# Patient Record
Sex: FEMALE | URBAN - METROPOLITAN AREA
[De-identification: names, ages, dates, MRNs, and addresses within clinical notes are randomized per-mention and may not be internally consistent; named-entity substitution may affect disease eponyms.]

---

## 2018-04-09 ENCOUNTER — HOSPITAL ENCOUNTER (OUTPATIENT)
Dept: MAMMOGRAPHY | Age: 38
Discharge: HOME | End: 2018-04-09
Attending: INTERNAL MEDICINE | Admitting: INTERNAL MEDICINE

## 2018-04-09 DIAGNOSIS — Z12.31 VISIT FOR SCREENING MAMMOGRAM: ICD-10-CM

## 2023-05-02 LAB — GROUP A STREP, PCR: NOT DETECTED

## 2023-05-19 LAB
ALANINE AMINOTRANSFERASE (SGPT) (U/L) IN SER/PLAS: 13 U/L (ref 7–45)
ALBUMIN (G/DL) IN SER/PLAS: 4.7 G/DL (ref 3.4–5)
ALBUMIN ELP: 4.4 G/DL (ref 3.4–5)
ALKALINE PHOSPHATASE (U/L) IN SER/PLAS: 61 U/L (ref 33–110)
ALLERGEN PAPER WASP IGE (KU/L): 2.32 KU/L
ALLERGEN VENOM: COMMON WASP (YELLOW JACKET) (VESPULA SPP.) IGE (KU/L): 10.7 KU/L
ALLERGEN VENOM: HONEY BEE (APIS MELLIFERA) IGE (KU/L): <0.1 KU/L
ALLERGEN VENOM: WHITE-FACED HORNET (DOLICHOVESPULA MACULATA) IGE (KU/L): 1.64 KU/L
ALLERGEN VENOM: YELLOW HORNET (DOLICHOVESPULA ARENARIA) IGE (KU/L): 0.34 KU/L
ALPHA 1: 0.4 G/DL (ref 0.2–0.6)
ALPHA 2: 1.1 G/DL (ref 0.4–1.1)
ANION GAP IN SER/PLAS: 14 MMOL/L (ref 10–20)
ASPARTATE AMINOTRANSFERASE (SGOT) (U/L) IN SER/PLAS: 13 U/L (ref 9–39)
BASOPHILS (10*3/UL) IN BLOOD BY AUTOMATED COUNT: 0.06 X10E9/L (ref 0–0.1)
BASOPHILS/100 LEUKOCYTES IN BLOOD BY AUTOMATED COUNT: 0.6 % (ref 0–2)
BETA: 0.9 G/DL (ref 0.5–1.2)
BILIRUBIN TOTAL (MG/DL) IN SER/PLAS: 0.3 MG/DL (ref 0–1.2)
CALCIUM (MG/DL) IN SER/PLAS: 10.1 MG/DL (ref 8.6–10.6)
CARBON DIOXIDE, TOTAL (MMOL/L) IN SER/PLAS: 27 MMOL/L (ref 21–32)
CHLORIDE (MMOL/L) IN SER/PLAS: 103 MMOL/L (ref 98–107)
CREATININE (MG/DL) IN SER/PLAS: 0.76 MG/DL (ref 0.5–1.05)
EOSINOPHILS (10*3/UL) IN BLOOD BY AUTOMATED COUNT: 0.16 X10E9/L (ref 0–0.7)
EOSINOPHILS/100 LEUKOCYTES IN BLOOD BY AUTOMATED COUNT: 1.5 % (ref 0–6)
ERYTHROCYTE DISTRIBUTION WIDTH (RATIO) BY AUTOMATED COUNT: 12.9 % (ref 11.5–14.5)
ERYTHROCYTE MEAN CORPUSCULAR HEMOGLOBIN CONCENTRATION (G/DL) BY AUTOMATED: 32.2 G/DL (ref 32–36)
ERYTHROCYTE MEAN CORPUSCULAR VOLUME (FL) BY AUTOMATED COUNT: 90 FL (ref 80–100)
ERYTHROCYTES (10*6/UL) IN BLOOD BY AUTOMATED COUNT: 4.71 X10E12/L (ref 4–5.2)
GAMMA GLOBULIN: 1.2 G/DL (ref 0.5–1.4)
GFR FEMALE: >90 ML/MIN/1.73M2
GLUCOSE (MG/DL) IN SER/PLAS: 81 MG/DL (ref 74–99)
HEMATOCRIT (%) IN BLOOD BY AUTOMATED COUNT: 42.5 % (ref 36–46)
HEMOGLOBIN (G/DL) IN BLOOD: 13.7 G/DL (ref 12–16)
IMMATURE GRANULOCYTES/100 LEUKOCYTES IN BLOOD BY AUTOMATED COUNT: 0.5 % (ref 0–0.9)
IMMUNOCAP IGE: 32.4 KU/L (ref 0–214)
IMMUNOCAP INTERPRETATION: NORMAL
LEUKOCYTES (10*3/UL) IN BLOOD BY AUTOMATED COUNT: 10.5 X10E9/L (ref 4.4–11.3)
LYMPHOCYTES (10*3/UL) IN BLOOD BY AUTOMATED COUNT: 2.69 X10E9/L (ref 1.2–4.8)
LYMPHOCYTES/100 LEUKOCYTES IN BLOOD BY AUTOMATED COUNT: 25.7 % (ref 13–44)
MONOCYTES (10*3/UL) IN BLOOD BY AUTOMATED COUNT: 0.49 X10E9/L (ref 0.1–1)
MONOCYTES/100 LEUKOCYTES IN BLOOD BY AUTOMATED COUNT: 4.7 % (ref 2–10)
NEUTROPHILS (10*3/UL) IN BLOOD BY AUTOMATED COUNT: 7 X10E9/L (ref 1.2–7.7)
NEUTROPHILS/100 LEUKOCYTES IN BLOOD BY AUTOMATED COUNT: 67 % (ref 40–80)
NRBC (PER 100 WBCS) BY AUTOMATED COUNT: 0 /100 WBC (ref 0–0)
PATH REVIEW-SERUM PROTEIN ELECTROPHORESIS: NORMAL
PLATELETS (10*3/UL) IN BLOOD AUTOMATED COUNT: 366 X10E9/L (ref 150–450)
POTASSIUM (MMOL/L) IN SER/PLAS: 3.9 MMOL/L (ref 3.5–5.3)
PROTEIN ELECTROPHORESIS INTERPRETATION: NORMAL
PROTEIN TOTAL: 8 G/DL (ref 6.4–8.2)
PROTEIN TOTAL: 8 G/DL (ref 6.4–8.2)
SEDIMENTATION RATE, ERYTHROCYTE: 46 MM/H (ref 0–20)
SODIUM (MMOL/L) IN SER/PLAS: 140 MMOL/L (ref 136–145)
THYROPEROXIDASE AB (IU/ML) IN SER/PLAS: 33 IU/ML
UREA NITROGEN (MG/DL) IN SER/PLAS: 11 MG/DL (ref 6–23)

## 2023-05-22 LAB — THYROGLOBULIN AB (IU/ML) IN SER/PLAS: <0.9 IU/ML (ref 0–4)

## 2023-05-25 LAB — URTICARIA-INDUCING ACTIVITY: <1

## 2023-05-29 LAB — TRYPTASE: 4.5 MCG/L

## 2023-12-01 ENCOUNTER — OFFICE VISIT (OUTPATIENT)
Dept: URGENT CARE | Facility: CLINIC | Age: 43
End: 2023-12-01
Payer: COMMERCIAL

## 2023-12-01 VITALS
RESPIRATION RATE: 20 BRPM | TEMPERATURE: 98.2 F | WEIGHT: 170 LBS | DIASTOLIC BLOOD PRESSURE: 73 MMHG | BODY MASS INDEX: 26.63 KG/M2 | HEART RATE: 90 BPM | SYSTOLIC BLOOD PRESSURE: 111 MMHG | OXYGEN SATURATION: 98 %

## 2023-12-01 DIAGNOSIS — J01.10 ACUTE NON-RECURRENT FRONTAL SINUSITIS: Primary | ICD-10-CM

## 2023-12-01 PROCEDURE — 1036F TOBACCO NON-USER: CPT | Performed by: FAMILY MEDICINE

## 2023-12-01 PROCEDURE — 99203 OFFICE O/P NEW LOW 30 MIN: CPT | Performed by: FAMILY MEDICINE

## 2023-12-01 RX ORDER — EPINEPHRINE 0.3 MG/.3ML
INJECTION INTRAMUSCULAR
COMMUNITY

## 2023-12-01 RX ORDER — AMOXICILLIN 875 MG/1
875 TABLET, FILM COATED ORAL 2 TIMES DAILY
Qty: 14 TABLET | Refills: 0 | Status: SHIPPED | OUTPATIENT
Start: 2023-12-01 | End: 2023-12-08

## 2023-12-01 RX ORDER — ACYCLOVIR 400 MG/1
400 TABLET ORAL 2 TIMES DAILY
COMMUNITY

## 2023-12-01 RX ORDER — AZELASTINE 1 MG/ML
1 SPRAY, METERED NASAL 2 TIMES DAILY
COMMUNITY
Start: 2023-02-11

## 2023-12-01 RX ORDER — MECLIZINE HYDROCHLORIDE 25 MG/1
25 TABLET ORAL 3 TIMES DAILY PRN
COMMUNITY
Start: 2023-03-01

## 2023-12-01 RX ORDER — BUSPIRONE HYDROCHLORIDE 10 MG/1
10 TABLET ORAL 2 TIMES DAILY
COMMUNITY

## 2023-12-01 NOTE — PROGRESS NOTES
Subjective   Patient ID: Edwina Logan is a 43 y.o. female.    HPI    The following portions of the chart were reviewed this encounter and updated as appropriate:       1 week of congestion, cough, malaise, sinus pressure.  Symptoms started last weekend with cold symptoms, improved after taking cold and flu medication but then past couple days worsened again.  Now feeling more pressure, postnasal drainage.  Wet cough.  No fever or chills.  Exposed to coworker last week with COVID, tested negative last night.  No fever or chills.  No body aches.  Has been also with bronchitis.  Patient with no history of asthma or bronchitis.  Continues to use Advil Cold and Sinus.  History of sinus infections.  No seasonal allergies.  No other complaints or symptoms.    Review of Systems  Objective   Physical Exam      Constitutional: vital signs reviewed. Well developed, well nourished. patient alert and patient without distress.   Head and Face: Normal and atraumatic.  Palpation of the face and sinuses: Normal.  Ears, Nose, Mouth, and Throat:   Hearing: Normal.  External inspection of nose: Normal.   Lips, teeth, tongue and gums: Normal and well hydrated. External inspection of ears: Normal. Ear canals and TMs: Normal.  Posterior pharynx moist and with post nasal drip.  Nasal mucosa: Congested, inflamed  Neck: No neck mass was observed. Supple. normal muscle tone.   Cardiovascular: Heart rate normal, normal S1 and S2, no gallops, no murmurs and no pericardial rub. Rhythm: Normal.  Pulmonary: No respiratory distress. Palpation of chest: Normal. Clear bilateral breath sounds.   Lymphatic: No cervical lymphadenopathy  Psych: Normal mood and affect    Procedures    Assessment/Plan

## 2023-12-01 NOTE — PATIENT INSTRUCTIONS
Drink plenty of fluids to help keep your mucus thin.  Apply moist heat (using a hot, damp towel) to your face for 5 to 10 minutes, several times a day.  Breathe warm, moist air from a steamy shower, a hot bath, or a sink filled with hot water. You can drink warm drinks such as tea.   Avoid extremely cool, dry air. Consider using a humidifier to increase the moisture in the air in your home.  Use saltwater nasal washes(saline irrigation) to help keep the nasal passages open and wash out mucus and bacteria.    You may use over-the-counter anti-inflammatory such as ibuprofen, Advil, Aleve for pain. For inflammation, ibuprofen doses include 400-600 mg 2-3 times per day. Tylenol is acceptable to use for pain.    Cough Suppressants or expectorants may be taken over-the-counter if you are having cough with your symptoms. These include Delsym, Robitussin-DM,    You will be started on antibiotic which will be sent to the pharmacy; please complete the regimen as directed even if your symptoms improve. It is recommended to take an Probiotic while on this medication to reduce symptoms of upset stomach, diarrhea, and in women, yeast infections.     Start taking a nasal steroid which may include: Flonase, Nasacort, or Nasonex and use as directed. These medications are now available over the counter.

## 2024-02-13 ENCOUNTER — OFFICE VISIT (OUTPATIENT)
Dept: DERMATOLOGY | Facility: CLINIC | Age: 44
End: 2024-02-13
Payer: COMMERCIAL

## 2024-02-13 DIAGNOSIS — R21 RASH AND OTHER NONSPECIFIC SKIN ERUPTION: Primary | ICD-10-CM

## 2024-02-13 PROCEDURE — 1036F TOBACCO NON-USER: CPT | Performed by: NURSE PRACTITIONER

## 2024-02-13 PROCEDURE — 99204 OFFICE O/P NEW MOD 45 MIN: CPT | Performed by: NURSE PRACTITIONER

## 2024-02-13 RX ORDER — CLOBETASOL PROPIONATE 0.46 MG/ML
SOLUTION TOPICAL 2 TIMES DAILY
Qty: 50 ML | Refills: 3 | Status: SHIPPED | OUTPATIENT
Start: 2024-02-13 | End: 2024-02-27

## 2024-02-13 RX ORDER — KETOCONAZOLE 20 MG/ML
SHAMPOO, SUSPENSION TOPICAL EVERY OTHER DAY
Qty: 120 ML | Refills: 2 | Status: SHIPPED | OUTPATIENT
Start: 2024-02-13

## 2024-02-13 NOTE — PROGRESS NOTES
Subjective     Edwina Logan is a 43 y.o. female who presents for the following: Rash.     New patient visit in for itch and irritation to scalp present for years. Patient states she has been prescribed in the past creams that were somewhat helpful she is unsure the names of.   Patient states over time the areas have spread and become worse.     Review of Systems:  No other skin or systemic complaints other than what is documented elsewhere in the note.    The following portions of the chart were reviewed this encounter and updated as appropriate:       Skin Cancer History  No skin cancer on file.    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), Other specified symptoms and signs involving the circulatory and respiratory systems (04/22/2021), Otitis media, unspecified, right ear (07/02/2019), Personal history of other diseases of the respiratory system (11/09/2018), Personal history of other diseases of the respiratory system (04/22/2021), Personal history of other diseases of the respiratory system (10/09/2019), Personal history of other diseases of the respiratory system (12/21/2019), Personal history of other infectious and parasitic diseases, Personal history of other infectious and parasitic diseases (06/09/2020), Personal history of other specified conditions, and Personal history of other specified conditions (07/10/2019).    Past Surgical History:  Edwina Logan  has a past surgical history that includes Other surgical history (07/10/2019).    Family History:  Patient family history is not on file.    Social History:  Edwina Logan  reports that she has never  smoked. She has never used smokeless tobacco. No history on file for alcohol use and drug use.    Allergies:  Bee pollens and Other    Current Medications / CAM's:    Current Outpatient Medications:     acyclovir (Zovirax) 400 mg tablet, Take 1 tablet (400 mg) by mouth 2 times a day., Disp: , Rfl:     azelastine (Astelin) 137 mcg (0.1 %) nasal spray, Administer 1 spray into each nostril 2 times a day., Disp: , Rfl:     busPIRone (Buspar) 10 mg tablet, Take 1 tablet (10 mg) by mouth 2 times a day., Disp: , Rfl:     clobetasol (Temovate) 0.05 % external solution, Apply topically 2 times a day for 14 days. Or until rash clears., Disp: 50 mL, Rfl: 3    EPINEPHrine (EpiPen) 0.3 mg/0.3 mL injection syringe, Inject into the muscle., Disp: , Rfl:     ketoconazole (NIZOral) 2 % shampoo, Apply topically every other day., Disp: 120 mL, Rfl: 2    meclizine (Antivert) 25 mg tablet, Take 1 tablet (25 mg) by mouth 3 times a day as needed for dizziness., Disp: , Rfl:      Objective   Well appearing patient in no apparent distress; mood and affect are within normal limits.      Assessment/Plan   1. Rash and other nonspecific skin eruption  Mid Occipital Scalp  Well demarcated erythematous, scaling, plaque to occipital scalp present intermittently. Improved in summer.  No significant history or family history.     Ddx: [facility] (favored) vs. [facility]    PLAN:  Ketoconazole 2% shampoo 1-2 times a week increasing and decreasing as needed  Clobetasol 0.05% scalp solution twice daily as needed for itching    ketoconazole (NIZOral) 2 % shampoo - Mid Occipital Scalp  Apply topically every other day.    clobetasol (Temovate) 0.05 % external solution - Mid Occipital Scalp  Apply topically 2 times a day for 14 days. Or until rash clears.    Related Procedures  Follow Up In Dermatology - Established Patient

## 2024-04-01 ENCOUNTER — TELEMEDICINE (OUTPATIENT)
Dept: DERMATOLOGY | Facility: CLINIC | Age: 44
End: 2024-04-01
Payer: COMMERCIAL

## 2024-04-01 DIAGNOSIS — R21 RASH AND OTHER NONSPECIFIC SKIN ERUPTION: ICD-10-CM

## 2024-04-01 DIAGNOSIS — L21.9 SEBORRHEIC DERMATITIS: Primary | ICD-10-CM

## 2024-04-01 PROCEDURE — 99213 OFFICE O/P EST LOW 20 MIN: CPT | Performed by: NURSE PRACTITIONER

## 2024-04-01 PROCEDURE — 1036F TOBACCO NON-USER: CPT | Performed by: NURSE PRACTITIONER

## 2024-04-01 NOTE — PROGRESS NOTES
Subjective     Edwina Logan is a 43 y.o. female who presents for the following: Rash.     Established patient last seen 02/2024 for scalp and prescribed to use Ketoconazole 2% shampoo 1-2 times a week increasing and decreasing as needed  Clobetasol 0.05% scalp solution twice daily as needed for itching    Review of Systems:  No other skin or systemic complaints other than what is documented elsewhere in the note.    The following portions of the chart were reviewed this encounter and updated as appropriate:       Skin Cancer History  No skin cancer on file.    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), Other specified symptoms and signs involving the circulatory and respiratory systems (04/22/2021), Otitis media, unspecified, right ear (07/02/2019), Personal history of other diseases of the respiratory system (11/09/2018), Personal history of other diseases of the respiratory system (04/22/2021), Personal history of other diseases of the respiratory system (10/09/2019), Personal history of other diseases of the respiratory system (12/21/2019), Personal history of other infectious and parasitic diseases, Personal history of other infectious and parasitic diseases (06/09/2020), Personal history of other specified conditions, and Personal history of other specified conditions (07/10/2019).    Past Surgical History:  Edwina Logan  has a past surgical history that includes Other surgical history (07/10/2019).    Family History:  Patient family history is not on file.    Social History:  Edwina Logan  reports that she has never smoked. She has never used smokeless  tobacco. No history on file for alcohol use and drug use.    Allergies:  Bee pollens and Other    Current Medications / CAM's:    Current Outpatient Medications:     acyclovir (Zovirax) 400 mg tablet, Take 1 tablet (400 mg) by mouth 2 times a day., Disp: , Rfl:     azelastine (Astelin) 137 mcg (0.1 %) nasal spray, Administer 1 spray into each nostril 2 times a day., Disp: , Rfl:     busPIRone (Buspar) 10 mg tablet, Take 1 tablet (10 mg) by mouth 2 times a day., Disp: , Rfl:     EPINEPHrine (EpiPen) 0.3 mg/0.3 mL injection syringe, Inject into the muscle., Disp: , Rfl:     ketoconazole (NIZOral) 2 % shampoo, Apply topically every other day., Disp: 120 mL, Rfl: 2    meclizine (Antivert) 25 mg tablet, Take 1 tablet (25 mg) by mouth 3 times a day as needed for dizziness., Disp: , Rfl:      Objective   Well appearing patient in no apparent distress; mood and affect are within normal limits.      Assessment/Plan   1. Seborrheic dermatitis  Mid Occipital Scalp  Clear on exam.  Patient states she has control with once weekly application of ketoconazole 2% shampoo    Plan: Counseling.  I counseled the patient regarding the following:  Skin care: Emollients, shampoos with tar,selenium or zinc pyrithione can improve seborrheic dermatitis.    Expectations: Seborrheic  Dermatitis is chronic in nature with periods of remissions and flares. Flares can be triggered by stress.  Contact office if: Seborrheic dermatitis worsens, or fails to improve despite several months of treatment.    I discussed with the patient that prolonged use of topical steroids can result in the increased appearance of superficial blood vessels (telangiectasias), lightening (hypopigmentation) and thinning of  the skin (atrophy).       Patient understands to avoid using high potency steroids in skin folds, the groin or the face.  The patient verbalized understanding of the proper use and possible adverse effects of topical steroids.      All of the patient's  questions and concerns were addressed.    PLAN:  Ketoconazole 2% shampoo 1-2 times a week increasing and decreasing as needed  Clobetasol 0.05% scalp solution twice daily       2. Rash and other nonspecific skin eruption    Related Procedures  Follow Up In Dermatology - Established Patient    Related Medications  ketoconazole (NIZOral) 2 % shampoo  Apply topically every other day.

## 2024-07-03 ENCOUNTER — OFFICE VISIT (OUTPATIENT)
Dept: URGENT CARE | Facility: CLINIC | Age: 44
End: 2024-07-03
Payer: COMMERCIAL

## 2024-07-03 VITALS
OXYGEN SATURATION: 98 % | RESPIRATION RATE: 20 BRPM | SYSTOLIC BLOOD PRESSURE: 120 MMHG | DIASTOLIC BLOOD PRESSURE: 78 MMHG | TEMPERATURE: 97.9 F | HEART RATE: 96 BPM

## 2024-07-03 DIAGNOSIS — J02.9 SORE THROAT: ICD-10-CM

## 2024-07-03 DIAGNOSIS — J03.00 ACUTE NON-RECURRENT STREPTOCOCCAL TONSILLITIS: Primary | ICD-10-CM

## 2024-07-03 LAB — POC RAPID STREP: POSITIVE

## 2024-07-03 PROCEDURE — 87880 STREP A ASSAY W/OPTIC: CPT | Performed by: PHYSICIAN ASSISTANT

## 2024-07-03 PROCEDURE — 99213 OFFICE O/P EST LOW 20 MIN: CPT | Performed by: PHYSICIAN ASSISTANT

## 2024-07-03 RX ORDER — AMOXICILLIN 500 MG/1
500 CAPSULE ORAL 3 TIMES DAILY
Qty: 30 CAPSULE | Refills: 0 | Status: SHIPPED | OUTPATIENT
Start: 2024-07-03 | End: 2024-07-13

## 2024-07-03 ASSESSMENT — ENCOUNTER SYMPTOMS: SORE THROAT: 1

## 2024-07-03 ASSESSMENT — PAIN SCALES - GENERAL: PAINLEVEL: 7

## 2024-07-03 NOTE — PROGRESS NOTES
Subjective   Patient ID: Edwina Logan is a 43 y.o. female.    Patient is a 43-year-old female who complains of acute onset of sore throat that she has been experiencing for approximately the past 16 hours.  Patient denies fever, chills, congestion, sinus pressure, ear pain or other illness symptoms.  Patient states she has had no contact with individuals known to have group A strep to her knowledge.      Sore Throat     The following portions of the chart were reviewed this encounter and updated as appropriate:       Review of Systems   HENT:  Positive for sore throat.    All other systems reviewed and are negative.  Objective   Physical Exam  Vitals and nursing note reviewed.   Constitutional:       Appearance: Normal appearance. She is normal weight.   HENT:      Head: Normocephalic and atraumatic.      Right Ear: Tympanic membrane, ear canal and external ear normal.      Left Ear: Tympanic membrane, ear canal and external ear normal.      Nose: Nose normal. No congestion or rhinorrhea.      Mouth/Throat:      Mouth: Mucous membranes are moist.      Pharynx: Oropharynx is clear. No oropharyngeal exudate or posterior oropharyngeal erythema.   Eyes:      Extraocular Movements: Extraocular movements intact.      Conjunctiva/sclera: Conjunctivae normal.      Pupils: Pupils are equal, round, and reactive to light.   Cardiovascular:      Rate and Rhythm: Normal rate and regular rhythm.      Pulses: Normal pulses.      Heart sounds: Normal heart sounds.   Pulmonary:      Effort: Pulmonary effort is normal. No respiratory distress.      Breath sounds: Normal breath sounds. No stridor. No wheezing, rhonchi or rales.   Musculoskeletal:      Cervical back: Normal range of motion and neck supple.   Skin:     General: Skin is warm and dry.      Capillary Refill: Capillary refill takes less than 2 seconds.   Neurological:      General: No focal deficit present.      Mental Status: She is alert and oriented to person, place, and  time.   Psychiatric:         Mood and Affect: Mood normal.         Behavior: Behavior normal.         Thought Content: Thought content normal.         Judgment: Judgment normal.     Assessment/Plan   Physical exam findings as noted above.  Rapid strep test is strongly positive.  Patient was provided with a prescription for amoxicillin 500 mg and supportive care instructions were discussed.  Patient verbalizes clear understanding of same.    CLINICAL IMPRESSION:  Acute Streptococcal Tonsillitis    Diagnoses and all orders for this visit:  Acute non-recurrent streptococcal tonsillitis  -     amoxicillin (Amoxil) 500 mg capsule; Take 1 capsule (500 mg) by mouth 3 times a day for 10 days.  Sore throat  -     POCT rapid strep A manually resulted    Patient disposition: Home

## 2024-07-14 ASSESSMENT — DERMATOLOGY QUALITY OF LIFE (QOL) ASSESSMENT
RATE HOW BOTHERED YOU ARE BY EFFECTS OF YOUR SKIN PROBLEMS ON YOUR ACTIVITIES (EG, GOING OUT, ACCOMPLISHING WHAT YOU WANT, WORK ACTIVITIES OR YOUR RELATIONSHIPS WITH OTHERS): 0 - NEVER BOTHERED
RATE HOW EMOTIONALLY BOTHERED YOU ARE BY YOUR SKIN PROBLEM (FOR EXAMPLE, WORRY, EMBARRASSMENT, FRUSTRATION): 0 - NEVER BOTHERED
RATE HOW BOTHERED YOU ARE BY SYMPTOMS OF YOUR SKIN PROBLEM (EG, ITCHING, STINGING BURNING, HURTING OR SKIN IRRITATION): 0 - NEVER BOTHERED
WHAT SINGLE SKIN CONDITION LISTED BELOW IS THE PATIENT ANSWERING THE QUALITY-OF-LIFE ASSESSMENT QUESTIONS ABOUT: PSORIASIS
WHAT SINGLE SKIN CONDITION LISTED BELOW IS THE PATIENT ANSWERING THE QUALITY-OF-LIFE ASSESSMENT QUESTIONS ABOUT: PSORIASIS
RATE HOW EMOTIONALLY BOTHERED YOU ARE BY YOUR SKIN PROBLEM (FOR EXAMPLE, WORRY, EMBARRASSMENT, FRUSTRATION): 0 - NEVER BOTHERED
RATE HOW BOTHERED YOU ARE BY EFFECTS OF YOUR SKIN PROBLEMS ON YOUR ACTIVITIES (EG, GOING OUT, ACCOMPLISHING WHAT YOU WANT, WORK ACTIVITIES OR YOUR RELATIONSHIPS WITH OTHERS): 0 - NEVER BOTHERED
RATE HOW BOTHERED YOU ARE BY SYMPTOMS OF YOUR SKIN PROBLEM (EG, ITCHING, STINGING BURNING, HURTING OR SKIN IRRITATION): 0 - NEVER BOTHERED

## 2024-07-14 ASSESSMENT — PATIENT GLOBAL ASSESSMENT (PGA): WHAT IS THE PGA: PATIENT GLOBAL ASSESSMENT:  1 - CLEAR

## 2024-07-15 ENCOUNTER — APPOINTMENT (OUTPATIENT)
Dept: DERMATOLOGY | Facility: CLINIC | Age: 44
End: 2024-07-15
Payer: COMMERCIAL

## 2024-07-15 DIAGNOSIS — L81.4 LENTIGO: ICD-10-CM

## 2024-07-15 DIAGNOSIS — Z12.83 SKIN CANCER SCREENING: Primary | ICD-10-CM

## 2024-07-15 DIAGNOSIS — L21.9 SEBORRHEIC DERMATITIS: ICD-10-CM

## 2024-07-15 DIAGNOSIS — L82.1 SEBORRHEIC KERATOSIS: ICD-10-CM

## 2024-07-15 DIAGNOSIS — D22.9 NEVUS: ICD-10-CM

## 2024-07-15 DIAGNOSIS — B35.3 TINEA PEDIS OF RIGHT FOOT: ICD-10-CM

## 2024-07-15 PROCEDURE — 1036F TOBACCO NON-USER: CPT | Performed by: NURSE PRACTITIONER

## 2024-07-15 PROCEDURE — 99214 OFFICE O/P EST MOD 30 MIN: CPT | Performed by: NURSE PRACTITIONER

## 2024-07-15 RX ORDER — KETOCONAZOLE 20 MG/G
CREAM TOPICAL 2 TIMES DAILY
Qty: 60 G | Refills: 2 | Status: SHIPPED | OUTPATIENT
Start: 2024-07-15

## 2024-07-15 NOTE — PROGRESS NOTES
Subjective     Edwina Logan is a 43 y.o. female who presents for the following: Skin Check.   Established patient in for full body skin exam.     Review of Systems:  No other skin or systemic complaints other than what is documented elsewhere in the note.    The following portions of the chart were reviewed this encounter and updated as appropriate:         Skin Cancer History  No skin cancer on file.      Specialty Problems    None       Objective   Well appearing patient in no apparent distress; mood and affect are within normal limits.    A full examination was performed including scalp, head, eyes, ears, nose, lips, neck, chest, axillae, abdomen, back, buttocks, bilateral upper extremities, bilateral lower extremities, hands, feet, fingers, toes, fingernails, and toenails. All findings within normal limits unless otherwise noted below.    Assessment/Plan   1. Skin cancer screening    The patient presented for a routine skin examination today. There are no specific concerns regarding skin health and no new or changing moles, lesions, or rashes.     Assessment: Based on the comprehensive skin examination, there were no concerning or abnormal findings. The patient's skin appeared to be in good health, without any notable dermatologic conditions or lesions.    Plan: Given the absence of any significant skin findings, no specific interventions or treatments are warranted at this time. The patient was educated on the importance of regular skin self-examinations and advised to promptly report any changes or concerns. Routine follow-up for a skin examination was recommended.    -These lesions have benign, reassuring patterns on dermoscopy.  -There were no concerning features found on exam today.  -Recommend continued self-observation, and to contact the office if any changes in nevi are  noticed.    Discussed/information given on safe sun practices and use of sunscreen, sun protective clothing or sun avoidance.  "Recommend to use OTC medication of sunscreen SPF 30 or higher on a daily basis prior to sun exposure to reduce the risk of skin cancer.    Contact Office if: Any lesions change in size, shape or color; itch, bum or bleed.         2. Nevus  Multiple benign appearing flesh colored to pigmented macules and papules     Plan: Counseling.  I counseled the patient regarding the following:  Instructions: Monthly self-skin checks to monitor for any changes in moles are recommended. Expectations: Benign Nevi are pigmented nests of cells within the skin.No treatment is necessary. Contact Office if: Any moles change in size, shape or color; itch, bum or bleed.    3. Lentigo  Scattered tan macules in sun-exposed areas.    Solar lentigo (a type of lentigo also known as a senile lentigo, age spot, or liver spot) is a benign pigmented macule appearing on fair-skinned individuals that is related to ultraviolet radiation (UVR) exposure, typically from the sun.     PLAN:  Limiting sun exposure through avoidance, protective clothing, and use of sunscreens can help prevent the appearance of solar lentigines.    If lesion changes or becomes symptomatic she should return to clinic    4. Seborrheic keratosis    Seborrheic keratoses (SKs) are extremely common benign neoplasms of the skin. There can be few or hundreds of these raised, \"stuck-on\"-appearing papules and plaques with well-defined borders. The cause is unknown, although there is a familial trait for the development of multiple SKs.      SKs tend to increase in incidence and number with increasing age.     Skin Care: Seborrheic Keratoses are benign. No treatment is necessary.    Patient was instructed to call the office if any lesions become irritated or inflamed              "

## 2024-12-09 ENCOUNTER — OFFICE VISIT (OUTPATIENT)
Dept: URGENT CARE | Age: 44
End: 2024-12-09
Payer: COMMERCIAL

## 2024-12-09 VITALS
WEIGHT: 165 LBS | RESPIRATION RATE: 16 BRPM | BODY MASS INDEX: 25.84 KG/M2 | DIASTOLIC BLOOD PRESSURE: 76 MMHG | HEART RATE: 91 BPM | TEMPERATURE: 97.2 F | OXYGEN SATURATION: 99 % | SYSTOLIC BLOOD PRESSURE: 114 MMHG

## 2024-12-09 DIAGNOSIS — J01.90 ACUTE SINUSITIS, RECURRENCE NOT SPECIFIED, UNSPECIFIED LOCATION: Primary | ICD-10-CM

## 2024-12-09 PROCEDURE — 99213 OFFICE O/P EST LOW 20 MIN: CPT | Performed by: STUDENT IN AN ORGANIZED HEALTH CARE EDUCATION/TRAINING PROGRAM

## 2024-12-09 PROCEDURE — 1036F TOBACCO NON-USER: CPT | Performed by: STUDENT IN AN ORGANIZED HEALTH CARE EDUCATION/TRAINING PROGRAM

## 2024-12-09 RX ORDER — AZITHROMYCIN 250 MG/1
TABLET, FILM COATED ORAL
Qty: 6 TABLET | Refills: 0 | Status: SHIPPED | OUTPATIENT
Start: 2024-12-09

## 2024-12-09 NOTE — PROGRESS NOTES
"Subjective   Patient ID: Edwina Logan \"Doreen" is a 44 y.o. female. They present today with a chief complaint of Sinus Problem (Sinus problems off and on for one month).    History of Present Illness  Patient reports symptoms have been present on and off for a month   Endorses sinus pain/pressure   Endorses thick mucous discharge   Endorses colored mucous  Draining to her chest  No reported wheezing or SOB  Notes mucous going to stomach  Reports son has walking pneumonia  Reports that she is inconsistent with her nasal spray  Reports that she's been to ENT in the past, unremarkable  Reports hx of sinusitis and had similar sx that improved with abx - amoxicillin, zpak      Past Medical History  Allergies as of 12/09/2024 - Reviewed 12/09/2024   Allergen Reaction Noted    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    Other Anaphylaxis 09/16/2019       (Not in a hospital admission)       Past Medical History:   Diagnosis Date    Acute maxillary sinusitis, unspecified 01/22/2019    Acute maxillary sinusitis    Body mass index (BMI) 25.0-25.9, adult 10/09/2019    Body mass index (BMI) of 25.0 to 25.9 in adult    Body mass index (BMI) 25.0-25.9, adult     BMI 25.0-25.9,adult    Candidiasis, unspecified 07/10/2019    Yeast infection    Chronic rhinitis 10/09/2019    Rhinosinusitis    Encounter for immunization     Encounter for immunization    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    Other specified symptoms and signs involving the circulatory and respiratory systems 04/22/2021    Chest congestion    Otitis media, unspecified, right ear 07/02/2019    Acute right otitis media    Personal history of other diseases of the respiratory system 11/09/2018    History of acute sinusitis    Personal history of other diseases of the respiratory system 04/22/2021    History of paranasal sinus congestion    Personal history of other diseases of the respiratory system 10/09/2019    History of chronic sinusitis "    Personal history of other diseases of the respiratory system 12/21/2019    History of acute sinusitis    Personal history of other infectious and parasitic diseases     History of herpes genitalis    Personal history of other infectious and parasitic diseases 06/09/2020    History of herpes simplex infection    Personal history of other specified conditions     History of fatigue    Personal history of other specified conditions 07/10/2019    History of fatigue       Past Surgical History:   Procedure Laterality Date    OTHER SURGICAL HISTORY  07/10/2019    Dilation and curettage        reports that she has never smoked. She has never used smokeless tobacco.                               Objective    Vitals:    12/09/24 1553   BP: 114/76   Pulse: 91   Resp: 16   Temp: 36.2 °C (97.2 °F)   SpO2: 99%   Weight: 74.8 kg (165 lb)     No LMP recorded.    Physical Exam  Constitutional:       General: She is not in acute distress.     Appearance: Normal appearance. She is not toxic-appearing or diaphoretic.   HENT:      Head: Normocephalic.      Right Ear: Tympanic membrane, ear canal and external ear normal. There is no impacted cerumen.      Left Ear: Tympanic membrane, ear canal and external ear normal. There is no impacted cerumen.      Nose: No rhinorrhea.      Mouth/Throat:      Pharynx: No posterior oropharyngeal erythema.   Eyes:      General: No scleral icterus.        Right eye: No discharge.         Left eye: No discharge.      Extraocular Movements: Extraocular movements intact.   Cardiovascular:      Rate and Rhythm: Normal rate and regular rhythm.   Pulmonary:      Effort: Pulmonary effort is normal. No respiratory distress.      Breath sounds: No stridor. No wheezing or rales.   Musculoskeletal:      Cervical back: Normal range of motion.   Neurological:      Mental Status: She is alert.   Psychiatric:         Mood and Affect: Mood normal.         Behavior: Behavior normal.         Thought Content: Thought  content normal.      Comments: Pleasant         Procedures    Point of Care Test & Imaging Results from this visit:      Diagnostic study results (if any) were reviewed by Neo Zayas MD.    Assessment/Plan   Allergies, medications, history, and pertinent labs/EKGs/Imaging reviewed by Neo Zayas MD.     Medical Decision Making:    Patient's symptoms are potentially 2/2 bacterial sinusitis given the length and characteristics of the symptoms vs viral etiology. Supportive care with tylenol/NSAIDs & mucinex, intranasal steroids/nasal saline. Given duration & severity of symptoms will order a course of zpak which is arguably 3rd line for sinusitis, however, son is home sick with reported walking pneumonia and her zpak would cover for atypicals (I.e. myocplasma pneumoniae). Strongly encourage intranasal spray consistent use. Probiotics if able. Follow up with primary care/urgent care if symptoms fail to improve       Orders and Diagnoses  Diagnoses and all orders for this visit:  Acute sinusitis, recurrence not specified, unspecified location  -     azithromycin (Zithromax) 250 mg tablet; Please follow [facility]ann[facility] package instructions      Patient disposition: Home      Medical Admin Record      Follow Up Instructions  No follow-ups on file.    Electronically signed by Neo Zayas MD  4:23 PM

## 2025-01-27 ENCOUNTER — OFFICE VISIT (OUTPATIENT)
Dept: URGENT CARE | Age: 45
End: 2025-01-27
Payer: COMMERCIAL

## 2025-01-27 VITALS
OXYGEN SATURATION: 99 % | SYSTOLIC BLOOD PRESSURE: 134 MMHG | BODY MASS INDEX: 26.63 KG/M2 | HEART RATE: 92 BPM | TEMPERATURE: 98 F | DIASTOLIC BLOOD PRESSURE: 76 MMHG | WEIGHT: 170 LBS

## 2025-01-27 DIAGNOSIS — Z20.822 EXPOSURE TO COVID-19 VIRUS: ICD-10-CM

## 2025-01-27 DIAGNOSIS — R05.9 COUGH, UNSPECIFIED TYPE: ICD-10-CM

## 2025-01-27 DIAGNOSIS — J40 BRONCHITIS: Primary | ICD-10-CM

## 2025-01-27 PROBLEM — F41.9 ANXIETY: Status: ACTIVE | Noted: 2025-01-27

## 2025-01-27 LAB
POC RAPID INFLUENZA A: NEGATIVE
POC RAPID INFLUENZA B: NEGATIVE
POC SARS-COV-2 AG BINAX: NORMAL

## 2025-01-27 PROCEDURE — 1036F TOBACCO NON-USER: CPT | Performed by: PHYSICIAN ASSISTANT

## 2025-01-27 PROCEDURE — 99214 OFFICE O/P EST MOD 30 MIN: CPT | Performed by: PHYSICIAN ASSISTANT

## 2025-01-27 PROCEDURE — 87804 INFLUENZA ASSAY W/OPTIC: CPT | Performed by: PHYSICIAN ASSISTANT

## 2025-01-27 PROCEDURE — 87811 SARS-COV-2 COVID19 W/OPTIC: CPT | Performed by: PHYSICIAN ASSISTANT

## 2025-01-27 RX ORDER — PREDNISONE 20 MG/1
20 TABLET ORAL 2 TIMES DAILY
Qty: 10 TABLET | Refills: 0 | Status: SHIPPED | OUTPATIENT
Start: 2025-01-27 | End: 2025-02-01

## 2025-01-27 RX ORDER — BENZONATATE 200 MG/1
200 CAPSULE ORAL 3 TIMES DAILY PRN
Qty: 21 CAPSULE | Refills: 0 | Status: SHIPPED | OUTPATIENT
Start: 2025-01-27 | End: 2025-02-03

## 2025-01-27 ASSESSMENT — ENCOUNTER SYMPTOMS
SHORTNESS OF BREATH: 0
ARTHRALGIAS: 0
SINUS PRESSURE: 0
AGITATION: 0
FEVER: 0
NAUSEA: 0
JOINT SWELLING: 0
ABDOMINAL PAIN: 0
VOMITING: 0
CHILLS: 0
RHINORRHEA: 1
CONFUSION: 0
DIARRHEA: 0
FATIGUE: 0
VOICE CHANGE: 0
SINUS PAIN: 0
CHEST TIGHTNESS: 0
COUGH: 1

## 2025-01-27 NOTE — PROGRESS NOTES
"Subjective   Patient ID: Edwina Logan \"Doreen" is a 44 y.o. female. They present today with a chief complaint of Earache (Cough, congestion, started last Thursday ).    History of Present Illness  Pt presented with lingering cough and ear pressure x 3-4 days. Denies direct exposure but reports  diagnosed with pneumonia early this month. She is taking OTC cold meds with minimal relief. Denies fever, chills, SOB.       Earache   Associated symptoms include coughing and rhinorrhea. Pertinent negatives include no abdominal pain, diarrhea, rash or vomiting.       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    Other Anaphylaxis 09/16/2019       (Not in a hospital admission)       Past Medical History:   Diagnosis Date    Acute maxillary sinusitis, unspecified 01/22/2019    Acute maxillary sinusitis    Body mass index (BMI) 25.0-25.9, adult 10/09/2019    Body mass index (BMI) of 25.0 to 25.9 in adult    Body mass index (BMI) 25.0-25.9, adult     BMI 25.0-25.9,adult    Candidiasis, unspecified 07/10/2019    Yeast infection    Chronic rhinitis 10/09/2019    Rhinosinusitis    Encounter for immunization     Encounter for immunization    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    Other specified symptoms and signs involving the circulatory and respiratory systems 04/22/2021    Chest congestion    Otitis media, unspecified, right ear 07/02/2019    Acute right otitis media    Personal history of other diseases of the respiratory system 11/09/2018    History of acute sinusitis    Personal history of other diseases of the respiratory system 04/22/2021    History of paranasal sinus congestion    Personal history of other diseases of the respiratory system 10/09/2019    History of chronic sinusitis    Personal history of other diseases of the respiratory system 12/21/2019    History of acute sinusitis    " Personal history of other infectious and parasitic diseases     History of herpes genitalis    Personal history of other infectious and parasitic diseases 06/09/2020    History of herpes simplex infection    Personal history of other specified conditions     History of fatigue    Personal history of other specified conditions 07/10/2019    History of fatigue       Past Surgical History:   Procedure Laterality Date    OTHER SURGICAL HISTORY  07/10/2019    Dilation and curettage        reports that she has never smoked. She has never used smokeless tobacco.    Review of Systems  Review of Systems   Constitutional:  Negative for chills, fatigue and fever.   HENT:  Positive for congestion, ear pain and rhinorrhea. Negative for sinus pressure, sinus pain and voice change.    Respiratory:  Positive for cough. Negative for chest tightness and shortness of breath.    Cardiovascular:  Negative for chest pain.   Gastrointestinal:  Negative for abdominal pain, diarrhea, nausea and vomiting.   Musculoskeletal:  Negative for arthralgias and joint swelling.   Skin:  Negative for rash.   Psychiatric/Behavioral:  Negative for agitation and confusion.                                   Objective    Vitals:    01/27/25 1401   BP: 134/76   Pulse: 92   Temp: 36.7 °C (98 °F)   SpO2: 99%   Weight: 77.1 kg (170 lb)     No LMP recorded.    Physical Exam  Constitutional:       Appearance: Normal appearance.   HENT:      Head: Normocephalic and atraumatic.      Right Ear: Tympanic membrane, ear canal and external ear normal.      Left Ear: Tympanic membrane, ear canal and external ear normal.      Nose: Congestion present. No rhinorrhea.      Mouth/Throat:      Pharynx: No oropharyngeal exudate or posterior oropharyngeal erythema.   Cardiovascular:      Rate and Rhythm: Normal rate and regular rhythm.      Heart sounds: No murmur heard.  Pulmonary:      Effort: Pulmonary effort is normal.      Breath sounds: Normal breath sounds. No wheezing.    Abdominal:      General: Abdomen is flat.      Palpations: Abdomen is soft.   Musculoskeletal:         General: Normal range of motion.   Neurological:      Mental Status: She is alert and oriented to person, place, and time.   Psychiatric:         Mood and Affect: Mood normal.         Procedures    Point of Care Test & Imaging Results from this visit  Results for orders placed or performed in visit on 01/27/25   POCT Covid-19 Rapid Antigen   Result Value Ref Range    POC JACOB-COV-2 AG  Presumptive negative test for SARS-CoV-2 (no antigen detected)     Presumptive negative test for SARS-CoV-2 (no antigen detected)   POCT Influenza A/B manually resulted   Result Value Ref Range    POC Rapid Influenza A Negative Negative    POC Rapid Influenza B Negative Negative      No results found.    Diagnostic study results (if any) were reviewed by Zully Carson PA-C.    Assessment/Plan   Allergies, medications, history, and pertinent labs/EKGs/Imaging reviewed by Zully Carson PA-C.     Medical Decision Making  POC tests negative, NAD, lung CTAB, suspicious for viral syndrome  Educated when need to seek medical attention again    Orders and Diagnoses  Diagnoses and all orders for this visit:  Bronchitis  -     benzonatate (Tessalon) 200 mg capsule; Take 1 capsule (200 mg) by mouth 3 times a day as needed for cough for up to 7 days. Do not crush or chew.  -     predniSONE (Deltasone) 20 mg tablet; Take 1 tablet (20 mg) by mouth 2 times a day for 5 days.  Exposure to COVID-19 virus  -     POCT Covid-19 Rapid Antigen  Cough, unspecified type  -     POCT Influenza A/B manually resulted      Medical Admin Record      Patient disposition: Home    Electronically signed by Zully Carson PA-C  2:34 PM